# Patient Record
(demographics unavailable — no encounter records)

---

## 2025-01-30 NOTE — ASSESSMENT
[FreeTextEntry1] : Patient long history of postnasal drip and mucus has another ENT but has persisted has tried nasal sprays with no significant improvement she also suffers from vertigo being followed by another otolaryngologist for that.  On examination endoscopically she has a deviated septum and what appears to be a polyp in her right ostiomeatal complex I put her on Flonase once again even though has not worked in the past as well as a Medrol Dosepak to see if that polyp will shrink I also sent her for a CAT scan to definitively evaluate the rest of her sinuses and determine if any additional intervention would be indicated this was all explained to her and all of her questions were answered she was accompanied by a family member.

## 2025-01-30 NOTE — HISTORY OF PRESENT ILLNESS
[de-identified] : Patient comes in with nasal congestion and yellow mucus frequently. Right now she is clear, but the mucus comes and goes. The last time she had it would be a few weeks ago.  She has dizziness as well when she gets up. She has had this issue in the past and she went to physical therapy for this but she still has the issue. She finished the PT a few months ago. On occasion she has ringing in the ears as well.  She has mucus in the throat as well. SHe has used nasal sprays in the past and complains that they dont work

## 2025-01-30 NOTE — REVIEW OF SYSTEMS
[Dizziness] : dizziness [Vertigo] : vertigo [Ear Noises] : ear noises [As Noted in HPI] : as noted in HPI [Nasal Congestion] : nasal congestion [Negative] : Heme/Lymph

## 2025-01-30 NOTE — END OF VISIT
[FreeTextEntry3] : I, Dr. Livingston personally performed the evaluation and management (E/M) services including all necessary procedures, for this new patient. That E/M includes conducting the clinically appropriate initial history &/or exam, assessing all conditions, and establishing the plan of care. Today, my JUAN, Jessica Lilly, was here to observe &/or participate in the visit & follow plan of care established by me.